# Patient Record
Sex: MALE | Race: WHITE | URBAN - METROPOLITAN AREA
[De-identification: names, ages, dates, MRNs, and addresses within clinical notes are randomized per-mention and may not be internally consistent; named-entity substitution may affect disease eponyms.]

---

## 2020-08-30 ENCOUNTER — EMERGENCY (EMERGENCY)
Facility: HOSPITAL | Age: 35
LOS: 1 days | Discharge: HOME | End: 2020-08-30
Attending: EMERGENCY MEDICINE | Admitting: EMERGENCY MEDICINE
Payer: COMMERCIAL

## 2020-08-30 VITALS
OXYGEN SATURATION: 97 % | WEIGHT: 184.97 LBS | RESPIRATION RATE: 18 BRPM | DIASTOLIC BLOOD PRESSURE: 94 MMHG | HEART RATE: 90 BPM | SYSTOLIC BLOOD PRESSURE: 138 MMHG | HEIGHT: 65 IN | TEMPERATURE: 98 F

## 2020-08-30 PROCEDURE — 99284 EMERGENCY DEPT VISIT MOD MDM: CPT

## 2020-08-30 PROCEDURE — 70450 CT HEAD/BRAIN W/O DYE: CPT | Mod: 26

## 2020-08-30 RX ORDER — SUMATRIPTAN SUCCINATE 4 MG/.5ML
6 INJECTION, SOLUTION SUBCUTANEOUS ONCE
Refills: 0 | Status: COMPLETED | OUTPATIENT
Start: 2020-08-30 | End: 2020-08-30

## 2020-08-30 RX ORDER — ONDANSETRON 8 MG/1
1 TABLET, FILM COATED ORAL
Qty: 12 | Refills: 0
Start: 2020-08-30

## 2020-08-30 RX ORDER — ONDANSETRON 8 MG/1
4 TABLET, FILM COATED ORAL ONCE
Refills: 0 | Status: COMPLETED | OUTPATIENT
Start: 2020-08-30 | End: 2020-08-30

## 2020-08-30 RX ORDER — SUMATRIPTAN SUCCINATE 4 MG/.5ML
25 INJECTION, SOLUTION SUBCUTANEOUS ONCE
Refills: 0 | Status: DISCONTINUED | OUTPATIENT
Start: 2020-08-30 | End: 2020-08-30

## 2020-08-30 RX ORDER — SUMATRIPTAN SUCCINATE 4 MG/.5ML
1 INJECTION, SOLUTION SUBCUTANEOUS
Qty: 12 | Refills: 0
Start: 2020-08-30

## 2020-08-30 RX ADMIN — SUMATRIPTAN SUCCINATE 6 MILLIGRAM(S): 4 INJECTION, SOLUTION SUBCUTANEOUS at 09:56

## 2020-08-30 NOTE — ED PROVIDER NOTE - CLINICAL SUMMARY MEDICAL DECISION MAKING FREE TEXT BOX
34 y/o M presenting with sudden onset of HA in the setting of intercourse with associated photophobia and nausea. On exam, Pt appears well and in no apparent distress. DDx includes subarachnoid hemorrhage, atypical migraine, and coital cephalalgia. Plan for CT head and Tx with antiemetics. Will reassess afterwards.

## 2020-08-30 NOTE — ED ADULT NURSE NOTE - NSIMPLEMENTINTERV_GEN_ALL_ED
Implemented All Universal Safety Interventions:  [place] to call system. Call bell, personal items and telephone within reach. Instruct patient to call for assistance. Room bathroom lighting operational. Non-slip footwear when patient is off stretcher. Physically safe environment: no spills, clutter or unnecessary equipment. Stretcher in lowest position, wheels locked, appropriate side rails in place.

## 2020-08-30 NOTE — ED PROVIDER NOTE - CROS ED NEURO POS
Prep Text (Optional): Prior to removal the treatment areas were prepped in the usual fashion. HEADACHE

## 2020-08-30 NOTE — ED PROVIDER NOTE - OBJECTIVE STATEMENT
34 y/o M with no PMHx presents to the ED for sudden onset of HA in the setting of recent intercourse. Pt reports he had intercourse last night and the night before. During both occasions, Pt had a sudden onset of a severe HA upon orgasm with associated photophobia and nausea. He also noted scintillating scotoma prior to the HA occurring. He denies rash, fevers and chills. Of note, Pt currently endorses his HA is mostly resolved.

## 2020-08-30 NOTE — ED PROVIDER NOTE - PATIENT PORTAL LINK FT
You can access the FollowMyHealth Patient Portal offered by [facility] by registering at the following website: [email or web address]. By joining [facility]’s FollowMyHealth portal, you will also be able to view your health information using other applications (apps) compatible with our system.

## 2020-08-30 NOTE — ED PROVIDER NOTE - PRINCIPAL DIAGNOSIS
Viral screening status finding Intractable migraine with status migrainosus, unspecified migraine type

## 2020-08-30 NOTE — ED PROVIDER NOTE - CARE PROVIDER_API CALL
Jessie Reed  NEUROLOGY  [address] [place] [address]  Phone: [phone]  Fax: [phone]  Follow Up Time:

## 2020-08-30 NOTE — ED PROVIDER NOTE - CARE PLAN
Principal Discharge DX:	Viral screening status finding Principal Discharge DX:	Intractable migraine with status migrainosus, unspecified migraine type

## 2020-08-30 NOTE — ED ADULT TRIAGE NOTE - CHIEF COMPLAINT QUOTE
Pt complaining of headache that started last night while having sex. Pt states that headache has improved since then. Pt states that he had similar headache on 8/27 that started while having sex. PT denies nausea, vomiting, blurry vision.

## 2020-09-03 DIAGNOSIS — G43.911 MIGRAINE, UNSPECIFIED, INTRACTABLE, WITH STATUS MIGRAINOSUS: ICD-10-CM

## 2020-09-03 DIAGNOSIS — Z11.59 ENCOUNTER FOR SCREENING FOR OTHER VIRAL DISEASES: ICD-10-CM

## 2020-09-03 DIAGNOSIS — R51 HEADACHE: ICD-10-CM

## 2020-09-27 NOTE — ED PROVIDER NOTE - NEUROLOGICAL, MLM
Mom calls in with question   Wants to know what is open by [facility]ud[facility]    Says son/pt was playing baseball - running backwards - fell - landing on left hand - now c/o pain in 2 fingers     Advised      Protocol and care advice reviewed  Caller states understanding of the recommended disposition  Advised to call back if further questions or concerns    Cory Delgado , RN / [facility]      Additional Information    General information question, no triage required and triager able to answer question    Protocols used: INFORMATION ONLY CALL - NO TRIAGE-P-AH       Alert and oriented, no focal deficits, no motor or sensory deficits. No temporal artery erythema or induration.

## 2022-06-15 ENCOUNTER — NEW PATIENT COMPREHENSIVE (OUTPATIENT)
Dept: URBAN - METROPOLITAN AREA CLINIC 63 | Facility: CLINIC | Age: 37
End: 2022-06-15

## 2022-06-15 DIAGNOSIS — H18.593: ICD-10-CM

## 2022-06-15 DIAGNOSIS — H18.513: ICD-10-CM

## 2022-06-15 DIAGNOSIS — H18.832: ICD-10-CM

## 2022-06-15 PROCEDURE — 76514 ECHO EXAM OF EYE THICKNESS: CPT

## 2022-06-15 PROCEDURE — 99203 OFFICE O/P NEW LOW 30 MIN: CPT

## 2022-06-15 ASSESSMENT — VISUAL ACUITY
OS_CC: 20/20-2
OD_CC: 20/20-2

## 2022-06-15 ASSESSMENT — TONOMETRY
OD_IOP_MMHG: 17
OS_IOP_MMHG: 18

## 2022-06-15 ASSESSMENT — PACHYMETRY
OD_CT_UM: 579
OS_CT_UM: 575

## 2022-10-21 ENCOUNTER — FOLLOW UP (OUTPATIENT)
Dept: URBAN - METROPOLITAN AREA CLINIC 63 | Facility: CLINIC | Age: 37
End: 2022-10-21

## 2022-10-21 DIAGNOSIS — H18.832: ICD-10-CM

## 2022-10-21 DIAGNOSIS — H18.513: ICD-10-CM

## 2022-10-21 DIAGNOSIS — H18.593: ICD-10-CM

## 2022-10-21 PROCEDURE — 92025 CPTRIZED CORNEAL TOPOGRAPHY: CPT

## 2022-10-21 PROCEDURE — 76514 ECHO EXAM OF EYE THICKNESS: CPT

## 2022-10-21 PROCEDURE — 99213 OFFICE O/P EST LOW 20 MIN: CPT

## 2022-10-21 ASSESSMENT — PACHYMETRY
OS_CT_UM: 583
OD_CT_UM: 581

## 2022-10-21 ASSESSMENT — VISUAL ACUITY
OD_CC: 20/25-1
OS_CC: 20/25-2

## 2023-05-23 ENCOUNTER — FOLLOW UP (OUTPATIENT)
Dept: URBAN - METROPOLITAN AREA CLINIC 63 | Facility: CLINIC | Age: 38
End: 2023-05-23

## 2023-05-23 DIAGNOSIS — H18.593: ICD-10-CM

## 2023-05-23 DIAGNOSIS — H18.832: ICD-10-CM

## 2023-05-23 DIAGNOSIS — H18.513: ICD-10-CM

## 2023-05-23 PROCEDURE — 76514 ECHO EXAM OF EYE THICKNESS: CPT

## 2023-05-23 PROCEDURE — 99213 OFFICE O/P EST LOW 20 MIN: CPT

## 2023-05-23 ASSESSMENT — VISUAL ACUITY
OS_CC: 20/30
OD_CC: 20/20-2

## 2023-05-23 ASSESSMENT — TONOMETRY
OD_IOP_MMHG: 12
OS_IOP_MMHG: 12

## 2023-05-23 ASSESSMENT — PACHYMETRY
OS_CT_UM: 591
OD_CT_UM: 583

## 2024-07-22 NOTE — ED ADULT NURSE NOTE - IS THE PATIENT ABLE TO BE SCREENED?
[de-identified] : I injected his right knee. I discussed at length with the patient the planned steroid and lidocaine injection. The risks, benefits, convalescence and alternatives were reviewed. The possible side effects discussed included but were not limited to: pain, swelling, heat, bleeding, and redness. Symptoms are generally mild but if they are extensive then contact the office. Giving pain relievers by mouth such as NSAIDs or Tylenol can generally treat the reactions to steroid and lidocaine. Rare cases of infection have been noted. Rash, hives and itching may occur post injection. If you have muscle pain or cramps, flushing and or swelling of the face, rapid heart beat, nausea, dizziness, fever, chills, headache, difficulty breathing, swelling in the arms or legs, or have a prickly feeling of your skin, contact a health care provider immediately. Following this discussion, the knee was prepped with Alcohol and under sterile condition the 80 mg Depo-Medrol and 6 cc Lidocaine injection was performed with a 20 gauge needle through a superolateral injection site. The needle was introduced into the joint, aspiration was performed to ensure intra-articular placement and the medication was injected. Upon withdrawal of the needle the site was cleaned with alcohol and a band aid applied. The patient tolerated the injection well and there were no adverse effects. Post injection instructions included no strenuous activity for 24 hours, cryotherapy and if there are any adverse effects to contact the office. I injected his left knee. I discussed at length with the patient the planned steroid and lidocaine injection. The risks, benefits, convalescence and alternatives were reviewed. The possible side effects discussed included but were not limited to: pain, swelling, heat, bleeding, and redness. Symptoms are generally mild but if they are extensive then contact the office. Giving pain relievers by mouth such as NSAIDs or Tylenol can generally treat the reactions to steroid and lidocaine. Rare cases of infection have been noted. Rash, hives and itching may occur post injection. If you have muscle pain or cramps, flushing and or swelling of the face, rapid heart beat, nausea, dizziness, fever, chills, headache, difficulty breathing, swelling in the arms or legs, or have a prickly feeling of your skin, contact a health care provider immediately. Following this discussion, the knee was prepped with Alcohol and under sterile condition the 80 mg Depo-Medrol and 6 cc Lidocaine injection was performed with a 20 gauge needle through a superolateral injection site. The needle was introduced into the joint, aspiration was performed to ensure intra-articular placement and the medication was injected. Upon withdrawal of the needle the site was cleaned with alcohol and a band aid applied. The patient tolerated the injection well and there were no adverse effects. Post injection instructions included no strenuous activity for 24 hours, cryotherapy and if there are any adverse effects to contact the office.  Yes

## 2025-06-06 ENCOUNTER — EMERGENCY VISIT (OUTPATIENT)
Dept: URBAN - METROPOLITAN AREA CLINIC 10 | Facility: CLINIC | Age: 40
End: 2025-06-06

## 2025-06-06 DIAGNOSIS — H18.513: ICD-10-CM

## 2025-06-06 DIAGNOSIS — H18.832: ICD-10-CM

## 2025-06-06 PROCEDURE — 99213 OFFICE O/P EST LOW 20 MIN: CPT

## 2025-06-06 ASSESSMENT — VISUAL ACUITY
OS_CC: 20/40+1
OD_CC: 20/30+1

## (undated) RX ORDER — SODIUM CHLORIDE 50 MG/G: 1/4 OINTMENT OPHTHALMIC EVERY EVENING

## (undated) RX ORDER — OFLOXACIN 3 MG/ML: 1 SOLUTION OPHTHALMIC